# Patient Record
Sex: FEMALE | URBAN - METROPOLITAN AREA
[De-identification: names, ages, dates, MRNs, and addresses within clinical notes are randomized per-mention and may not be internally consistent; named-entity substitution may affect disease eponyms.]

---

## 2018-11-08 ENCOUNTER — IMPORTED ENCOUNTER (OUTPATIENT)
Dept: URBAN - METROPOLITAN AREA CLINIC 50 | Facility: CLINIC | Age: 53
End: 2018-11-08

## 2021-01-27 NOTE — PATIENT DISCUSSION
Dr. 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 ASSESSMENT/PLAN: s/p PKP OD, stable scar OS. Continue cyclosporine 1% QID OU.

## 2021-01-27 NOTE — PATIENT DISCUSSION
Dr. 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 ASSESSMENT/PLAN: Much improved after course of Valtrex. 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 Stable today.

## 2021-01-27 NOTE — PATIENT DISCUSSION
The cataracts are creating some visual symptoms that are tolerable at this time. Will plan for cataract surgery in 2021 with Dr. Dora Martínez. Patient advised that corneal condition may limit visual recovery following cataract surgery.

## 2021-01-27 NOTE — PATIENT DISCUSSION
The cataracts are creating some visual symptoms that are tolerable at this time. Will plan for cataract surgery in 2021 with Dr. Rosa Garrido. Patient advised that corneal condition may limit visual recovery following cataract surgery.

## 2021-01-27 NOTE — PROCEDURE NOTE: CLINICAL
PROCEDURE NOTE: Removal of Corneal FB at Slit Lamp OD. Diagnosis: Corneal Transplant. Prep: Betadine Flush. Prior to treatment, the risks/benefits/alternatives were discussed. The patient wished to proceed with procedure. Corneal foreign body was removed using a 25 gauge needle at the slit lamp. Patient tolerated procedure well. There were no complications. Post-op instructions given. Cintia Humboldt

## 2021-01-27 NOTE — PATIENT DISCUSSION
Rakel Villegas 1/27/21: No epithelial defects or infiltrates, graft is well-centered, no signs of rejection.  One broken suture temporally and 2 tight sutures (3 Total) removed at slit lamp today with no complication. Moxiflox 4x/day for 5 days then stop. Continue PF qod and Cyclosporine qid. Follow-up with Dr. Leroy Calhoun for checkup and continued suture removal. Pred qday for 5 days then can resume at qod. Recommend on holding off on scleral lens for a couple days.

## 2021-01-27 NOTE — PATIENT DISCUSSION
1/27/2021 Shobha Heaton: cataract is getting worse which is likely affecting vision. Recommend f/u with Dr. Rianna Delgado.

## 2021-01-27 NOTE — PATIENT DISCUSSION
Dr. Cielo Ley 1/8/2021: Looks good, no sign of rejection. Will start to take out sutures. 4 Sutures removed 1/8/21. 1 drop of betadine instilled prior and post removal. Patient became faint during the procedure but did not lose consciousness and left in good condition. Begin Moxi QID x4 days then d/c. Patient will use Pred qod until follow up with Dr. Mandy Collins. Scleral lens fitting done by Dr. Hunter Graham.

## 2021-04-17 ASSESSMENT — TONOMETRY
OD_IOP_MMHG: 16
OS_IOP_MMHG: 16

## 2021-04-17 ASSESSMENT — VISUAL ACUITY
OS_SC: 20/40
OD_PH: 20/20
OD_SC: 20/50+2
OS_CC: J1
OS_PH: 20/20
OD_CC: J1

## 2022-02-25 NOTE — PATIENT DISCUSSION
Shobha Heaton 1/27/21: No epithelial defects or infiltrates, graft is well-centered, no signs of rejection.  One broken suture temporally and 2 tight sutures (3 Total) removed at slit lamp today with no complication. Moxiflox 4x/day for 5 days then stop. Continue PF qod and Cyclosporine qid. Follow-up with Dr. Megan Alba for checkup and continued suture removal. Pred qday for 5 days then can resume at qod. Recommend on holding off on scleral lens for a couple days.

## 2022-02-25 NOTE — PATIENT DISCUSSION
Dr. Isabella Reid 1/8/2021: Looks good, no sign of rejection. Will start to take out sutures. 4 Sutures removed 1/8/21. 1 drop of betadine instilled prior and post removal. Patient became faint during the procedure but did not lose consciousness and left in good condition. Begin Moxi QID x4 days then d/c. Patient will use Pred qod until follow up with Dr. Krystina Lakhani. Scleral lens fitting done by Dr. Berna Matos. 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

## 2022-02-25 NOTE — PATIENT DISCUSSION
Dr. Isabella Reid 1/8/2021::HISTORICAL: Acanthamoeba keratitis OU, treated by Dr. Krystina Lakhani at [facility]. complicated by HZV infection/reactivation. 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 ASSESSMENT/PLAN: s/p PKP OD, stable scar OS. Continue cyclosporine 1% QID OU.

## 2022-02-25 NOTE — PATIENT DISCUSSION
Dr. 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 ASSESSMENT/PLAN: Much improved after course of Valtrex. 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 Stable today.

## 2022-04-06 NOTE — PATIENT DISCUSSION
patient to follow up with Dr Viviana Cooper to reevaluate scleral lens power after IOP returns to 'normal'.

## 2022-04-06 NOTE — PATIENT DISCUSSION
Lynsey Riley 1/27/21: No epithelial defects or infiltrates, graft is well-centered, no signs of rejection.  One broken suture temporally and 2 tight sutures (3 Total) removed at slit lamp today with no complication. Moxiflox 4x/day for 5 days then stop. Continue PF qod and Cyclosporine qid. Follow-up with Dr. Aileen Watkins for checkup and continued suture removal. Pred qday for 5 days then can resume at qod. Recommend on holding off on scleral lens for a couple days.

## 2022-04-06 NOTE — PATIENT DISCUSSION
Dr. Arrieta Credit 1/8/2021: Looks good, no sign of rejection. Will start to take out sutures. 4 Sutures removed 1/8/21. 1 drop of betadine instilled prior and post removal. Patient became faint during the procedure but did not lose consciousness and left in good condition. Begin Moxi QID x4 days then d/c. Patient will use Pred qod until follow up with Dr. Ketty Schrader. Scleral lens fitting done by Dr. Cherl Crigler.

## 2022-04-06 NOTE — PATIENT DISCUSSION
Dr. Chung Palisade 1/8/2021::HISTORICAL: Acanthamoeba keratitis OU, treated by Dr. Filomena Dior at [facility]. complicated by HZV infection/reactivation. 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 ASSESSMENT/PLAN: s/p PKP OD, stable scar OS. Continue cyclosporine 1% QID OU.

## 2022-12-06 NOTE — PATIENT DISCUSSION
Dr. Paco Ferris 1/8/2021::HISTORICAL: Acanthamoeba keratitis OU, treated by Dr. Moncho Thompson at [facility]. complicated by HZV infection/reactivation. 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 ASSESSMENT/PLAN: s/p PKP OD, stable scar OS. Continue cyclosporine 1% QID OU.

## 2022-12-06 NOTE — PATIENT DISCUSSION
12/06/2022 JOD: No leak, no signs of infection, no signs of melting. BCL removed in office today. Decrease Cyclosporine to bid. Restart PF daily.  Patient is clear to resume wearing scleral lens OD, advised to have it removed next visit to do Topography. Pt can stop.   Can recheck IOP and temporal cornea in 1-2months.

## 2022-12-06 NOTE — PATIENT DISCUSSION
Dr. Mobley Drivers 1/8/2021: Looks good, no sign of rejection. Will start to take out sutures. 4 Sutures removed 1/8/21. 1 drop of betadine instilled prior and post removal. Patient became faint during the procedure but did not lose consciousness and left in good condition. Begin Moxi QID x4 days then d/c. Patient will use Pred qod until follow up with Dr. Anastacia Fairbanks. Scleral lens fitting done by Dr. Blas León.

## 2022-12-06 NOTE — PATIENT DISCUSSION
Kenia Wallis 1/27/21: No epithelial defects or infiltrates, graft is well-centered, no signs of rejection.  One broken suture temporally and 2 tight sutures (3 Total) removed at slit lamp today with no complication. Moxiflox 4x/day for 5 days then stop. Continue PF qod and Cyclosporine qid. Follow-up with Dr. Berny Platt for checkup and continued suture removal. Pred qday for 5 days then can resume at qod. Recommend on holding off on scleral lens for a couple days.

## 2022-12-06 NOTE — PATIENT DISCUSSION
patient to follow up with Dr Cristina Ann to reevaluate scleral lens power after IOP returns to 'normal'.

## 2023-01-24 NOTE — PATIENT DISCUSSION
Planning for cataract surgery with Dr. Norma King later this year.  Might be doing [address] prior to surgery.

## 2023-01-24 NOTE — PATIENT DISCUSSION
Dr. 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 ASSESSMENT/PLAN: Much improved after course of Valtrex. 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 Stable today.

## 2023-01-24 NOTE — PATIENT DISCUSSION
Sherryle [facility] [place] 1/27/21: No epithelial defects or infiltrates, graft is well-centered, no signs of rejection.  One broken suture temporally and 2 tight sutures (3 Total) removed at slit lamp today with no complication. Moxiflox 4x/day for 5 days then stop. Continue PF qod and Cyclosporine qid. Follow-up with Dr. Adri Reyes for checkup and continued suture removal. Pred qday for 5 days then can resume at qod. Recommend on holding off on scleral lens for a couple days.

## 2023-01-24 NOTE — PATIENT DISCUSSION
Dr. Malcolm Bonds 1/8/2021::HISTORICAL: Acanthamoeba keratitis OU, treated by Dr. Kenneth Millard at [facility]. complicated by HZV infection/reactivation. 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 ASSESSMENT/PLAN: s/p PKP OD, stable scar OS. Continue cyclosporine 1% QID OU.

## 2023-01-24 NOTE — PATIENT DISCUSSION
Dr. Gumaro Diallo 1/8/2021: Looks good, no sign of rejection. Will start to take out sutures. 4 Sutures removed 1/8/21. 1 drop of betadine instilled prior and post removal. Patient became faint during the procedure but did not lose consciousness and left in good condition. Begin Moxi QID x4 days then d/c. Patient will use Pred qod until follow up with Dr. Sánchez Done. Scleral lens fitting done by Dr. Kaur Navarro.

## 2024-03-22 NOTE — PATIENT DISCUSSION
If pt plans on surgery with CFS, goal -3.00. Yes-Patient/Caregiver accepts free interpretation services...